# Patient Record
(demographics unavailable — no encounter records)

---

## 2024-10-15 NOTE — HISTORY OF PRESENT ILLNESS
[de-identified] : 70-year-old female complaints in both hands.  Comes in today for evaluation.  She axes a bit better than she was when she had made the appointment.  Comes in today for the evaluation.  She is complaining about some right sided thumb pain and discomfort.  She is also reporting left-sided thumb pain and discomfort as well as left-sided numbness and tingling in the fingers.

## 2024-10-15 NOTE — PHYSICAL EXAM
[de-identified] : On examination patient has positive Tinel's medial compression test left wrist.  Good thenar strength without thenar atrophy.  There is deformity along the basal joint.  Positive axial grind is present.  Normal capillary refill.  No erythema ecchymoses or abrasions.  Incisors a well-healed surgical skin incision from her thumb collateral ligament repair.  She has pain along the basal joint some tenderness along the MP joint of the thumb as well.  Negative Tinel's medial compression test

## 2024-10-15 NOTE — ASSESSMENT
[FreeTextEntry1] : Patient is left-sided and right-sided basal joint arthritis.  She also has left-sided carpal tunnel syndrome.  Right now the symptoms do not warrant a surgical intervention.  As the pain is under control.  She knows that this condition can flare and get worse.  She should at that time modify her activities.  She can contact the office at that time.  Is that symptoms are carpal tunnel and basal joint arthritis can wax and wane.  She will try wrist bracing on the right side we gave her a wrist splint.  She will see us back as needed.

## 2024-10-25 NOTE — DISCUSSION/SUMMARY
[FreeTextEntry1] : During VEEG monitoring, she had more vestibular symptoms when she was on lower dosage of gabapentin. However, she did not push the event bottom. At this point, out of all symptoms presumably from TLE, she considers the vestibular symptom is most troublesome and unsafe. She would like to maximize benefit of AED. I advised her to make follow up visit appointment with Dr. Stockton to discuss changes. For now, will continue same dosage of gabapentin.

## 2024-10-25 NOTE — HISTORY OF PRESENT ILLNESS
[FreeTextEntry1] : Since last visit, she underwent VEEG monitoring for 3 days in [facility]. During the hospital stay, gabapentin was tapered down. At the second night, she was not able to sleep well. She had pushed bottom several times. However, the EEG was not showing seizure when she pushed bottom. Left mid-temporal slowing and epileptiform discharges were recorded during the hospitalization. Upon discharge, she returned to regular dosage of gabapentin.  She still has episodes of poor balance due to vestibular symptoms. Of note, she did not push the bottom when she was dizzy. She had carotid duplex done recently in [facility] was not told of severe abnormality.  Had telemedicine with rheumatologist. Maintains on Medrol, but on tapering dose. Not happy about that because the tapering lead to more symptoms. She tapers the medication because she is to have rotator cuff surgery.  No new change of dosage of thyroid medication. No worsening of hair loss. Persistent symptoms of CTS, worse in left side. No severe flare up of low back pain. She sees chiropractor as needed for neck and lower back symptoms.  Background collective details up to 9/2023:  Poor tolerance to oxcarbazepine, zonisamide, Keppra, topiramate and lacosamide. On gabapentin 3600mg/day, divided by 1800mg bid. Breakthrough pain in thigh surfaces only when she delayed in taking the dosage. Multiple arthralgias remarkably improved when she was on steroid. Symptoms of CTS recurred 3 days after she tapered off steroid. She was treated again by Medrol. Balance control has not further improved. No fall. Lightheadedness is still daily as per visit in 9/2023. She recalled that her mom had the similar symptoms. Cognitively, she is more fluent in her thinking process on gabapentin, though not feeling that she is good enough. She contracted Covid-19 infection in 2022. She received monoclonal antibody treatment on the 5th day. She was symptomatic for a long time. All symptoms from Covid-19 infection resolved over time. No improvement of vision after cataract surgery. Comprehensive Lyme testing was negative. Other atypical infection, babesiosis was diagnosed and treated. However, she does not feel much of a difference. Off statin. Not getting hearing aid. However, she had returned to ENT for examination and studies were inconclusive. Cardiologist does not feel she needs antiplatelet agent. Bilateral ICA stenosis 50-69%. Cardiologist does not feel that she needs intervention. She has not seek second opinion, although she has recommendation to consult Dr. Eli. Dexa scan only shows osteopenia.

## 2024-10-25 NOTE — PHYSICAL EXAM
[FreeTextEntry1] : General Appearance - Well groomed, Not in acute distress. Build & Nutrition - Well nourished.  Neuropsychiatric Mental status exam performed with findings of - no evidence of hallucinations, delusions, obsessions or homicidal/suicidal ideation.  Head and Neck Head - normocephalic, atraumatic with no lesions or palpable masses.  Neurologic Mental Status Affect - normal. Speech - Normal. Thought content/perception - Normal. Cognitive function - Normal. Cranial Nerves I Olfactory - Normal. II Optic - Visual fields - Normal. III Oculomotor - Normal Bilaterally. IV Trochlear - Bilateral - Normal - Bilateral. V Trigeminal - Normal Bilaterally. VI Abducens - Bilateral - Normal - Bilateral. VII Facial - Normal Bilaterally. VIII Acoustic - Bilateral - Hearing normal - Bilateral. IX Glossopharyngeal / X Vagus - Normal. XI Accessory - Normal Bilaterally. XII Hypoglossal - Bilateral - Normal - Bilateral. Sensory Light Touch - Decreased: Note: lateral aspect of thigh, calf, and dorsum of left foot. Pain: Decreased: Note: same as light touch. Temperature: Decreased: Note: same as light touch. Vibration - Intact - Globally. Proprioception - Bilateral - Normal - Bilateral. Motor Bulk and Contour - Normal. Tone - Normal. Strength - 5/5 normal muscle strength - Left Upper Extremity and Right Upper Extremity. 5-/5 reduced muscle strength - Left Lower Extremity and Right Lower Extremity. Reflexes (Dermatomes) 1/2 Decreased - Right Achilles (L5-S2). 2/2 Normal - Right Bicep (C5-6), Left Bicep (C5-6), Right Brachioradialis (C5-6), Left Brachioradialis (C5-6), Right Triceps (C7-8), Left Triceps (C7-8), Right Knee (L2-4), Left Knee (L2-4) and Left Achilles (L5-S2). Plantar Reflexes (L4-S2) - Bilateral - Flexion - Bilateral. Coordination - Normal. Gait - Waddling and Medium-based.  Other pertinent findings - Note: iliac crest was tender bilaterally.

## 2024-10-25 NOTE — PROCEDURE
[FreeTextEntry1] : 2/11/18 brain: reported non-specific white matter changes. 6/19/21 brain: reported atrophy, chronic ischemic changes. Images reviewed. 8/9/22 brain MRI: reported mild to moderate chronic ischemic changes, supratentorial. Images reviewed. Of note, she has no risk factors for ischemic small vessel disease in brain other than age. 3/24/22 Repeat MRI of orbit, face and neck with and without contrast reported unremarkable. MRI: 2015, 2016, cervical spine, remarkable for C5-6 disc compression, off to the left side, touching spinal cord. No change of cord signal. 2015: lumbar spine, mild disc bulges. Images reviewed. 10/28/19 reported DJDs without neural compromise. Images reviewed. 4/24/21 cervical spine: reported C4-5, C5-6 discs, images reviewed. No cord compression. 7/20/22 cervical spine: reported multilevel DJDs, congenital multilevel spinal canal stenosis, no cord compression. Images reviewed. 8/8/22 lumbar spine: reported moderate to severe central canal stenosis at L4-5 level. Images reviewed. Other - Note: 11/6/18 X-ray: no evidence of sinusitis.  12/18/18 EEG: intermittent epileptiform discharges in left temporal region. 3/3/2020 EEG: rhythmic slowing up to 6 seconds and rare epileptiform discharges left temporal region. 7/7/21 EEG: left epileptiform discharges, spread to frontal region. 3/24/23 EEG: rare epileptiform activities left frontal temporal region. 5/23/24 REEG: reported left temporal slowing. 10/9/24 VEEG: recorded left mid-temporal slowing and epileptiform activity interictally. Surface negative EEG when she reported events.  8/29/18 NCS/EMG: generalized polymyositis, mild bilateral CTS. 10/5/19 NCS/EMG: persistence of generalized polymyositis, worsening left more than right CTS. 3/3/2020. 4/12/21 NCS/EMG: bilateral C7, C8 and T1 radiculopathy; slight progression of right CTS, moderate, unchanged left CTS, persistence of myopathic dysfunction. 5/18/22 NCS/EMG: improved left more than right CTS, generalized myopathy with features of mild myositis, likely due to partially treated by prednisone; mild right S1 radiculopathy.  9/18/18 blood work only remarkable for low albumin level. 1/19/19 gabapentin 8.7(2800mg/day). 8/29/19 oxcarbazepine 4.2 (300mg/day, gabapentin 7.3 (2400mg/day) 11/14/19 gabapentin 4.5 (2400mg/day), oxcarbazepine 15.9 (900mg/day), CRP 22.5, elevated alpha 1 globulin. 7/15/20 Na and K both elevated. CO2 also elevated. MARK 1:80. CRP 22.2. Low TSH. Gabapentin 9.0 (3600mg/day). 12/28/20 oxcarbazepine 6.2 (600mg/day), gabapentin 7.2 (3600mg/day). 7/6/21 gabapentin 6.0 (2400mg/day), low TSH level, elevated CRP, ALT 30, B12 443. 11/22/21 oxcarbazepine 16.9 (900mg/day). 9/9/22 gabapentin 14.4 (2400mg/day), lacosamide 7.9 (200mg/day). 11/29/23 elevated CRP, low TSH level. 5/16/24 NMJ blood work: negative, gabapentin 14.1 (3600mg/day).

## 2024-11-02 NOTE — ASSESSMENT
[FreeTextEntry1] : Lashawn is a 70 year old right handed female with PMH of Hypothyroidism, GERD, Hyperlipidemia, Meralgia Paraesthetica, Insomnia  - No evidence for potential for Sz -The events as/Hx is also not suggestive of Sz - The events as captured  during the monitoring are also none epileptic  - Vertigo  / vestibulopathy( decreased  hearing on the right was told she needs hearing aid?) -sleep D/O - R/O depression  Plan discussed the findings and symptoms extensively I had told her that I believe she does not have epilepsy we discussed her sleep  pattern He does not want to be started on antidepressant I am hopeful that starting to think about it she might come up with a better way to deal with her grief Blood test  screaning for auto-immune

## 2024-11-02 NOTE — PHYSICAL EXAM
[FreeTextEntry1] : A/A/Ox3 slightly irritable, sarcastic and defensive in the beginning but gradually is more relax and interacting easier emotional and tearful talking about her daughter Follows 4 step commands crosses the midline well CN- normal no nystagmus no tremor no drift slight limitation of movement of the left shoulder normal Romberg stable gait ( slightly mechanically compromised)

## 2024-11-02 NOTE — HISTORY OF PRESENT ILLNESS
[FreeTextEntry1] : Lashawn is here with her  for the F/U.  She was admitted to the EMU during early part of the October . During her stay she did have multiple events that included different /variable symptoms( see the descriptions in the report). None were associated with epileptiform EEG changes .  slowing and sharp waves I had reviewed the recording. I believe at the most the  The baseline EEG was read as abnormal suggestive of left FT sharp waves. I had reviewed the EEG , I believe the sharp waves are not epileptiform in nature. She was discharged on Neurontin and had a F/U with Gonsalo Tenorio She was asked to have F/U with me. She continues to complain of the same symptoms None are seen while in the office Main complaint is feeling unsteady. To the best of her description it could  be defined as false sense of motion she also reports the Tinglings and twitching in different areas .  She is clearly still grieving on her daughter's loss. She is emotional and tearful talking about her. She goes to bed around 7 pm and wakes up around 4 am They are very much involved with their Mandaeism. She does have her regular F/U with her Rheumatologist. according to her  the current working Dx is polymyalgia rheumatica

## 2024-12-17 NOTE — HISTORY OF PRESENT ILLNESS
[de-identified] : Patient is here for evaluation of right shoulder pain Affecting quality of life Has pain and weakness with loss of rom Wakes up at night due to pain  had a recent fall onto shoulders, right worse  is scheduled for surgery   NAD phyllis shoulder: TTP ant GH joint, bicipital groove FF 0-140 (passive 175) ER 40 IR L5 Pain with terminal rom Weakness to abduction and ER Pos Impingement Pos Bang Pos Cross Arm Adduction Negative instability Positive scapula dyskinesia  XRay phyllis shoulder negative for fracture, dislocation, arthritis  mri right shoulder: pRCT, slap tear mri left shoulder: fRCT (Subscap), near full LHBT tear   Plan went over findings explained the xrays explained the mri tx options explained op vs nonop will proceed with surgery to left shoulder due to sig pain, phyllis shoudler injections  left shoulder arthroscopy, rotator cuff repair, decompression, distal clavicle excision, possible open biceps tenodesis  Operative and nonoperative options discussed with patient. Surgical risks, benefits, and alternatives explained. Surgical risks include but are not exclusive to bleeding, infection, neurovascular damage, continued pain, stiffness, scarring, rsd, dvt/pe, potential failure of surgery that may require further surgery in the future. I went over incisions and rehabilitation. All questions answered.

## 2024-12-31 NOTE — HISTORY OF PRESENT ILLNESS
[FreeTextEntry1] : Patient is a 70F with FCC.  HISTORICAL RISK FACTORS: - history of left breast lumpectomy/duct excision  (Dr. Stevenson) for bloody nipple discharge.   Pathology was benign. -Family history of breast cancer in her sister at 59; father had lung cancer. -, age at first live birth was 29. -prior OCP use. -s/p TAHBSO (2017)  RISK ASSESSMENT: Halle 5yr -- 3.3% lifetime -- 11.1% TC v6 5yr -- 2.4% lifetime -- 6.6%  Her most recent imaging is as follows: 2021:  B/L Dx Mammo & Sono --> BIRADS 2 -Breast Density: Almost entirely fatty. -There is a stable small ovoid density in the upper outer quadrant right breast.  Ultrasound evaluation was performed including examination of all four quadrants of the breast(s) and the retroareolar regions. -No suspicious abnormalities were seen sonographically in either breast.  -Both axillary regions are unremarkable  22: B scg MMG/US --> BIRADS 1 and BIRADS 2 Scattered density R (N15 4 x 3 x 5mm cyst (previously (9N12 6 x 3 x 7mm in 2018) Recommendation for annual screening  2023:B scg mmg--> BIARDS 2 -scattered areas of fibroglandular density. -scattered benign-appearing nodules with no dominant masses seen in both breasts. Rec annual screening   Most recent imaging: B/L Screening Mammo - 2024: -There are scattered areas of fibroglandular density. -There are scattered stable benign masses seen in both breasts. -No suspicious mass, grouping of calcifications, or other abnormality is identified. -There is no mammographic evidence of malignancy. BI-RADS Category 2:  Benign  Denies any current complaints. No acute changes to her breasts.

## 2024-12-31 NOTE — PAST MEDICAL HISTORY
[Postmenopausal] : The patient is postmenopausal [Menarche Age ____] : age at menarche was [unfilled] [Menopause Age____] : age at menopause was [unfilled] [Total Preg ___] : G[unfilled] [Live Births ___] : P[unfilled]  [Age At Live Birth ___] : Age at live birth: [unfilled] [History of Hormone Replacement Treatment] : has no history of hormone replacement treatment [FreeTextEntry5] : d&c, DON/BSO. [FreeTextEntry6] : none [FreeTextEntry8] :  for one to two months.

## 2024-12-31 NOTE — ASSESSMENT
[FreeTextEntry1] : Patient is a 70F with FCC  She underwent bilateral screening mmg/us in 12/19/22 that showed a right cyst (BIRADS 2).  She underwent bilateral mmg in 12/2023 which showed bilateral benign appearing nodules (BIRADS 2) with annual screening recommended. She has bilateral scg mmg in 12/2024 which showed stable bilateral benign masses (BIRADS 2).  We discussed her most recent imaging. We discussed it was benign.  We again discussed breast density. Increasing breast density has been found to increase ones risk of breast cancer, but at this time, there is no clear indication for additional imaging in this setting, as both US and MRI have not been found to improve survival. One can consider bilateral screening US. However, out of 1000 women screened, the use of routine US will only identify an additional 3-5 cancers. The use of US was found to increase the likelihood of undergoing more imaging and more biopsies. She does not have dense breasts   All questions and concerns were answered in detail.  Patient is for bilateral mmg in 12/2025.  She is to follow up after imaging, pending any interval changes.  Total time spent on encounter was greater than 30 minutes , which included face to face time with the patient, performing an exam, reviewing previous medical records, reviewing current imaging/ pathology, documenting in patient record and coordinating care/imaging. Greater than 50% of the encounter was spent on counseling and coordination of her breast issue.

## 2024-12-31 NOTE — DATA REVIEWED
[FreeTextEntry1] : B/L Screening Mammo - 12/27/2024: MAMMOGRAM FINDINGS: Mammography was performed including the following views: bilateral craniocaudal with tomosynthesis, bilateral mediolateral oblique with tomosynthesis.  The examination includes digital synthetic 2D and digital tomosynthesis 3D images. Additional imaging analysis was performed using CAD (computer-aided detection) software.  There are scattered areas of fibroglandular density.  There are scattered stable benign masses seen in both breasts.  No suspicious mass, grouping of calcifications, or other abnormality is identified.  IMPRESSION: There is no mammographic evidence of malignancy.  RECOMMENDATION: Unless otherwise indicated by clinical findings, annual screening mammography recommended.  ASSESSMENT: BI-RADS Category 2:  Benign

## 2024-12-31 NOTE — PHYSICAL EXAM
[Normocephalic] : normocephalic [EOMI] : extra ocular movement intact [No Supraclavicular Adenopathy] : no supraclavicular adenopathy [No Cervical Adenopathy] : no cervical adenopathy [Examined in the supine and seated position] : examined in the supine and seated position [No dominant masses] : no dominant masses in right breast  [No dominant masses] : no dominant masses left breast [No Nipple Retraction] : no left nipple retraction [No Nipple Discharge] : no left nipple discharge [No Axillary Lymphadenopathy] : no left axillary lymphadenopathy [No Rashes] : no rashes [No Ulceration] : no ulceration [de-identified] : NL respirations

## 2025-02-09 NOTE — HISTORY OF PRESENT ILLNESS
[de-identified] : History: 70F for eval of bilateral knee, had prior rt tka, and has left knee oa. has plans for shoulder surgery with aht later this year. knee pain with activity, localized to the joint, partially improved with rest. ADL's noted to be increasingly difficult without use of modifying therapy.   Medical History: Past Medical History is unchanged, all medical issues and medications are stable.  Review of systems is negative for fevers, chills, chest pain, shortness of breath, unexplained weight fluctuations, GI or  symptoms.  Smoking history and social habits are unchanged.   Exam: pain with PROM, limited by guading, no gross instability, strength normal, NVID.   Imaging: X-rays were reviewed with the patient.   AP and Lateral views were obtained of the knees, including the contralateral side for comparison purposes. X-rays are negative for acute bone or soft tissue trauma. right total knee replacement in good alignment, left knee with moderate OA  Impression: osteoarthritis bilateral knee  Plan: Plan is for non op management to include rest, activity modification, therapeutic exercises, stretching program, use of nsaids and analgesics, and weight control.

## 2025-03-28 NOTE — HISTORY OF PRESENT ILLNESS
[FreeTextEntry1] : Lashawn is here for the F/U She seems to be happy, smiling, well dressed and well groomed  She reports : 1- having occasionally a wave like sensation in the head ( a false sense of motion) 2- a 4 hour of clear vertigo and being able to relieve it by laying on the left side and keeping her eyes open 3- LBP 4- far sight horizontal double vision X years ( saw multiple MD and <prism also did not help> 5- random multifocal muscle twitches  Denies having had any events suggestive of Sz NO change in MS mood and memory and sleep are stable and good the blood test she did shows high CRP. that she says has been there for years No issues with bladder

## 2025-03-28 NOTE — ASSESSMENT
[FreeTextEntry1] : 1- spine and knee disease 2-  < none specific CTD)?? 3- R/o somatoform D/O 4- peripheral vertigo 5- doble vison R/O ischemic  plan discussed sleep and weight discussed hydrotherapy discussed the vertigo and possible vestibular rehab

## 2025-03-28 NOTE — PHYSICAL EXAM
[FreeTextEntry1] : A/A/Ox3 good mood good attention Follows 4 step commands crosses the midline well CN- normal. Monroy lateralizing to the right . Air conduction better on left no nystagmus no tremor no drift slight limitation of movement of the left shoulder normal Romberg stable gait ( slightly mechanically compromised).

## 2025-04-10 NOTE — HISTORY OF PRESENT ILLNESS
[de-identified] : Patient is here for evaluation of right shoulder pain Affecting quality of life Has pain and weakness with loss of rom Wakes up at night due to pain  had a recent fall onto shoulders, right worse  is scheduled for surgery  NAD phyllis shoulder: TTP ant GH joint, bicipital groove FF 0-140 (passive 175) ER 40 IR L5 Pain with terminal rom Weakness to abduction and ER Pos Impingement Pos Bang Pos Cross Arm Adduction Negative instability Positive scapula dyskinesia  XRay phyllis shoulder negative for fracture, dislocation, arthritis  mri right shoulder: pRCT, slap tear mri left shoulder: fRCT (Subscap), near full LHBT tear   Plan went over findings explained the xrays explained the mri tx options explained op vs nonop will proceed with surgery to left shoulder all questions answered  left shoulder arthroscopy, rotator cuff repair, decompression, distal clavicle excision, possible open biceps tenodesis  Operative and nonoperative options discussed with patient. Surgical risks, benefits, and alternatives explained. Surgical risks include but are not exclusive to bleeding, infection, neurovascular damage, continued pain, stiffness, scarring, rsd, dvt/pe, potential failure of surgery that may require further surgery in the future. I went over incisions and rehabilitation. All questions answered.

## 2025-04-24 NOTE — HISTORY OF PRESENT ILLNESS
[FreeTextEntry1] : Since last visit, she had followed up appointment with Dr. Stockton. Dr. Stockton does not feel that she has partial onset seizure. She maintains on gabapentin because tapering dosage made her feel dizzier and in more pain.  During today's visit, she states that she still has at least daily attack of various degree of vestibular symptoms. She was offered vestibular therapy. Eyes closed during attack helps. All toes are very sensitive to touch at nighttime, worst last three toes.   She still has episodes of poor balance. No fall. No need of assistance device.   Had follow up visit with rheumatologist. Maintains on Medrol, 4mg daily. Pending left shoulder surgery. However, not able to taper off dosage due to worsening pain when she taper.  No new change of dosage of thyroid medication. No worsening of hair loss. Persistent symptoms of CTS, worse in left side. No severe flare up of low back pain. She sees chiropractor as needed for neck and lower back symptoms.

## 2025-04-24 NOTE — PHYSICAL EXAM
[FreeTextEntry1] : General Appearance - Well groomed, Not in acute distress. Build & Nutrition - Well nourished.  Neuropsychiatric Mental status exam performed with findings of - no evidence of hallucinations, delusions, obsessions or homicidal/suicidal ideation.  Head and Neck Head - normocephalic, atraumatic with no lesions or palpable masses.  Neurologic Mental Status Affect - normal. Speech - Normal. Thought content/perception - Normal. Cognitive function - Normal. Cranial Nerves II Optic - Visual fields - Normal. III Oculomotor - Normal Bilaterally. IV Trochlear - Bilateral - Normal - Bilateral. V Trigeminal - Normal Bilaterally. VI Abducens - Bilateral - Normal - Bilateral. VII Facial - Normal Bilaterally. VIII Acoustic - Bilateral - Hearing normal - Bilateral. IX Glossopharyngeal / X Vagus - Normal. XI Accessory - Normal Bilaterally. XII Hypoglossal - Bilateral - Normal - Bilateral. Sensory Light Touch - Decreased: distal lower extremities up to distal calves Pain/Temperature: Increase in distal lower extremities up to distal calves. Vibration - Intact - Globally. Proprioception - Bilateral - Normal - Bilateral. Motor Bulk and Contour - Normal. Tone - Normal. Strength - 5/5 normal muscle strength - Left Upper Extremity and Right Upper Extremity. 5-/5 reduced muscle strength - Left Lower Extremity and Right Lower Extremity. Reflexes (Dermatomes) 0/2 Absent - Right and Left Achilles (L5-S2). 1/2 Decreased -Right Knee (L2-4), Left Knee (L2-4) 2/2 Normal - Right Bicep (C5-6), Left Bicep (C5-6), Right Brachioradialis (C5-6), Left Brachioradialis (C5-6), Right Triceps (C7-8), Left Triceps (C7-8). Plantar Reflexes (L4-S2) - Bilateral - Flexion - Bilateral. Romberg: positive. Coordination - Normal. Gait - Waddling and Medium-based.  Other pertinent findings - Note: iliac crest was tender bilaterally.

## 2025-04-24 NOTE — DISCUSSION/SUMMARY
[FreeTextEntry1] : She has more symptoms suggestive of developing polyneuropathy during today's examination. Will repeat NCS/EMG of upper and lower extremities for more objective measurement of neuromuscular dysfunctions. For now, encourage her to try vestibular therapy. Continue same dosage of gabapentin.

## 2025-06-10 NOTE — HISTORY OF PRESENT ILLNESS
[de-identified] : Patient here for evaluation left knee pain. Denies instability Pain with ambulation and stairs  NAD Left knee: No skin breakdown Tricompartmental TTP Positive patella grind PF crepitus Negative lachman Negative varus/valgus instability ROM 0-110 Pain with forced extension and flexion NVI Compartments soft and NT  Xray reviewed and significant for left knee arthritis (h/o right tka)  Plan went over findings explained the imaging and oa will coint cons tx pt will order left knee visco due to pain, left knee injection  Large Joint Injection was performed because of pain/rom. Anesthesia: ethyl chloride sprayed topically. Dexamethasone 2 cc of 4mg.   Lidocaine: 2 cc of 1%  Medication was injected in the left knee. Patient has tried OTC's including aspirin, Ibuprofen, Aleve etc or prescription NSAIDS, and/or exercises at home and/ or physical therapy without satisfactory response. After verbal consent using sterile preparation and technique. The risks, benefits, and alternatives to cortisone injection were explained in full to the patient. Risks outlined include but are not limited to infection, sepsis, bleeding, scarring, skin discoloration, temporary increase in pain, syncopal episode, failure to resolve symptoms, allergic reaction, symptom recurrence, and elevation of blood sugar in diabetics. Patient understood the risks. All questions were answered. After discussion of options, patient requested an injection. Oral informed consent was obtained and sterile prep was done of the injection site. Sterile technique was utilized for the procedure including the preparation of the solutions used for the injection. Patient tolerated the procedure well. Advised to ice the injection site this evening. Prep with alcohol locally to site. Sterile technique used.

## 2025-06-20 NOTE — PROCEDURE
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

## 2025-06-20 NOTE — DATA REVIEWED
[de-identified] : 2/11/18 brain: reported non-specific white matter changes. 6/19/21 brain: reported atrophy, chronic ischemic changes. Images reviewed. 8/9/22 brain MRI: reported mild to moderate chronic ischemic changes, supratentorial. Images reviewed. Of note, she has no risk factors for ischemic small vessel disease in brain other than age. 3/24/22 Repeat MRI of orbit, face and neck with and without contrast reported unremarkable. MRI: 2015, 2016, cervical spine, remarkable for C5-6 disc compression, off to the left side, touching spinal cord. No change of cord signal. 2015: lumbar spine, mild disc bulges. Images reviewed. 10/28/19 reported DJDs without neural compromise. Images reviewed. 4/24/21 cervical spine: reported C4-5, C5-6 discs, images reviewed. No cord compression. 7/20/22 cervical spine: reported multilevel DJDs, congenital multilevel spinal canal stenosis, no cord compression. Images reviewed. 8/8/22 lumbar spine: reported moderate to severe central canal stenosis at L4-5 level. Images reviewed. Other - Note: 11/6/18 X-ray: no evidence of sinusitis.  12/18/18 EEG: intermittent epileptiform discharges in left temporal region. 3/3/2020 EEG: rhythmic slowing up to 6 seconds and rare epileptiform discharges left temporal region. 7/7/21 EEG: left epileptiform discharges, spread to frontal region. 3/24/23 EEG: rare epileptiform activities left frontal temporal region. 5/23/24 REEG: reported left temporal slowing. 10/9/24 VEEG: recorded left mid-temporal slowing and epileptiform activity interictally. Surface negative EEG when she reported events.  8/29/18 NCS/EMG: generalized polymyositis, mild bilateral CTS. 10/5/19 NCS/EMG: persistence of generalized polymyositis, worsening left more than right CTS. 3/3/2020. 4/12/21 NCS/EMG: bilateral C7, C8 and T1 radiculopathy; slight progression of right CTS, moderate, unchanged left CTS, persistence of myopathic dysfunction. 5/18/22 NCS/EMG: improved left more than right CTS, generalized myopathy with features of mild myositis, likely due to partially treated by prednisone; mild right S1 radiculopathy.  9/18/18 blood work only remarkable for low albumin level. 1/19/19 gabapentin 8.7(2800mg/day). 8/29/19 oxcarbazepine 4.2 (300mg/day, gabapentin 7.3 (2400mg/day) 11/14/19 gabapentin 4.5 (2400mg/day), oxcarbazepine 15.9 (900mg/day), CRP 22.5, elevated alpha 1 globulin. 7/15/20 Na and K both elevated. CO2 also elevated. MARK 1:80. CRP 22.2. Low TSH. Gabapentin 9.0 (3600mg/day). 12/28/20 oxcarbazepine 6.2 (600mg/day), gabapentin 7.2 (3600mg/day). 7/6/21 gabapentin 6.0 (2400mg/day), low TSH level, elevated CRP, ALT 30, B12 443. 11/22/21 oxcarbazepine 16.9 (900mg/day). 9/9/22 gabapentin 14.4 (2400mg/day), lacosamide 7.9 (200mg/day). 11/29/23 elevated CRP, low TSH level. 5/16/24 NMJ blood work: negative, gabapentin 14.1 (3600mg/day).

## 2025-06-20 NOTE — HISTORY OF PRESENT ILLNESS
[FreeTextEntry1] : Visit on 4/24/25:  Since last visit, she had followed up appointment with Dr. Stockton. Dr. Stockton does not feel that she has partial onset seizure. She maintains on gabapentin because tapering dosage made her feel dizzier and in more pain. During today's visit, she states that she still has at least daily attack of various degree of vestibular symptoms. She was offered vestibular therapy. Eyes closed during attack helps. All toes are very sensitive to touch at nighttime, worst last three toes. She still has episodes of poor balance. No fall. No need of assistance device. Had follow up visit with rheumatologist. Maintains on Medrol, 4mg daily. Pending left shoulder surgery. However, not able to taper off dosage due to worsening pain when she tapers. No new change of dosage of thyroid medication. No worsening of hair loss. Persistent symptoms of CTS, worse in left side. No severe flare up of low back pain. She sees chiropractor as needed for neck and lower back symptoms.  Patient is referred to have electrodiagnostic studies to discern neurogenic/myogenic dysfunction responsible for symptoms of upper and lower extremities.  Since last visit, she maintains on the same medications. She feels that her knees buckle more often. The low back pain on the right paralumbar region has been more intense. The symptoms of LEs are also worse. She did not go for vestibular therapy waiting for this study to be done. However, she had no falls. 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  She is considering finding another rheumatologist to see if new work up and treatment is justified. She plans to consult Dr. Diaz.

## 2025-06-20 NOTE — PHYSICAL EXAM
[FreeTextEntry1] : General Appearance - Well groomed, Not in acute distress. Build & Nutrition - Well nourished.  Neuropsychiatric Mental status exam performed with findings of - no evidence of hallucinations, delusions, obsessions or homicidal/suicidal ideation.  Head and Neck Head - normocephalic, atraumatic with no lesions or palpable masses.  Neurologic Mental Status Affect - normal. Speech - Normal. Thought content/perception - Normal. Cognitive function - Normal. Cranial Nerves II Optic - Visual fields - Normal. III Oculomotor - Normal Bilaterally. IV Trochlear - Bilateral - Normal - Bilateral. V Trigeminal - Normal Bilaterally. VI Abducens - Bilateral - Normal - Bilateral. VII Facial - Normal Bilaterally. VIII Acoustic - Bilateral - Hearing normal - Bilateral. IX Glossopharyngeal / X Vagus - Normal. XI Accessory - Normal Bilaterally. XII Hypoglossal - Bilateral - Normal - Bilateral. Sensory Light Touch - Decreased: distal lower extremities up to distal calves Pain/Temperature: Increase in distal lower extremities up to distal calves. Vibration - Intact - Globally. Proprioception - Bilateral - Normal - Bilateral. Motor Bulk and Contour - Normal. Tone - Normal. Strength - 5/5 normal muscle strength - Left Upper Extremity and Right Upper Extremity. 5-/5 reduced muscle strength - Left Lower Extremity. 4+-5- - Right Lower Extremity. Reflexes (Dermatomes) 0/2 Absent - Right and Left Achilles (L5-S2). 1/2 Decreased -Right Knee (L2-4), Left Knee (L2-4) 2/2 Normal - Right Bicep (C5-6), Left Bicep (C5-6), Right Brachioradialis (C5-6), Left Brachioradialis (C5-6), Right Triceps (C7-8), Left Triceps (C7-8). Plantar Reflexes (L4-S2) - Bilateral - Flexion - Bilateral. Romberg: positive. Coordination - Normal. Gait - Waddling and Medium-based.

## 2025-06-20 NOTE — DISCUSSION/SUMMARY
[FreeTextEntry1] : She still has multiple neuromuscular dysfunctions likely secondary to systemic connective tissue disorder, especially the entrapment neuropathy and myopathy. Of note, she has been treated for the condition with Medrol. The myositis appears to be less active, and the median nerve entrapment is stable. There is significant progression of lumbosacral radiculopathy in the current study. Her last MRI of lumbar spine is almost 3-year-old, which showed moderate to severe spinal stenosis at L4-5 level. Will repeat study with contrast because the more severely involved less is S1 radiculopathy. She will continue same dosage of gabapentin and check level prior to follow up.

## 2025-07-03 NOTE — HISTORY OF PRESENT ILLNESS
[de-identified] : Follow-up of knees.  71F with left knee pain after replacement 4 years ago at [facility]. The replacement prescription, bone scan showed no loosening or infection. Left knee has moderate arthritis. She had a recent injection in the left knee. She also had a Synvisc injection ordered for her which she plans to get next month.  I recommend she follow-up with the rheumatologist, she wants to see if she can taper off the Medrol that she has been taking. She can follow-up with me as needed.

## 2025-07-24 NOTE — HISTORY OF PRESENT ILLNESS
[de-identified] : Patient here for evaluation left knee pain. Denies instability Pain with ambulation and stairs  NAD Left knee: No skin breakdown Tricompartmental TTP Positive patella grind PF crepitus Negative lachman Negative varus/valgus instability ROM 0-110 Pain with forced extension and flexion NVI Compartments soft and NT  Xray reviewed and significant for left knee arthritis (h/o right tka)  Plan went over findings explained the imaging and oa will coint cons tx pt due to pain, left knee injection  Large Joint Injection was performed because of pain/rom. Anesthesia: ethyl chloride sprayed topically. 6cc synvisc 1 Medication was injected in the left knee. Patient has tried OTC's including aspirin, Ibuprofen, Aleve etc or prescription NSAIDS, and/or exercises at home and/ or physical therapy without satisfactory response. After verbal consent using sterile preparation and technique. The risks, benefits, and alternatives to cortisone injection were explained in full to the patient. Risks outlined include but are not limited to infection, sepsis, bleeding, scarring, skin discoloration, temporary increase in pain, syncopal episode, failure to resolve symptoms, allergic reaction, symptom recurrence, and elevation of blood sugar in diabetics. Patient understood the risks. All questions were answered. After discussion of options, patient requested an injection. Oral informed consent was obtained and sterile prep was done of the injection site. Sterile technique was utilized for the procedure including the preparation of the solutions used for the injection. Patient tolerated the procedure well. Advised to ice the injection site this evening. Prep with alcohol locally to site. Sterile technique used.